# Patient Record
Sex: FEMALE | URBAN - METROPOLITAN AREA
[De-identification: names, ages, dates, MRNs, and addresses within clinical notes are randomized per-mention and may not be internally consistent; named-entity substitution may affect disease eponyms.]

---

## 2021-08-05 ENCOUNTER — APPOINTMENT (RX ONLY)
Dept: URBAN - METROPOLITAN AREA CLINIC 151 | Facility: CLINIC | Age: 66
Setting detail: DERMATOLOGY
End: 2021-08-05

## 2021-08-05 DIAGNOSIS — L30.9 DERMATITIS, UNSPECIFIED: ICD-10-CM

## 2021-08-05 PROCEDURE — ? COUNSELING

## 2021-08-05 PROCEDURE — ? TREATMENT REGIMEN

## 2021-08-05 PROCEDURE — 99202 OFFICE O/P NEW SF 15 MIN: CPT

## 2021-08-05 PROCEDURE — ? PRESCRIPTION

## 2021-08-05 PROCEDURE — ? DIAGNOSIS COMMENT

## 2021-08-05 RX ORDER — CLOBETASOL PROPIONATE 0.5 MG/G
OINTMENT TOPICAL BID
Qty: 1 | Refills: 3 | Status: ERX | COMMUNITY
Start: 2021-08-05

## 2021-08-05 RX ADMIN — CLOBETASOL PROPIONATE: 0.5 OINTMENT TOPICAL at 00:00

## 2021-08-05 NOTE — HPI: OTHER
Condition:: Rash
Please Describe Your Condition:: Patient presents with concerns of a red, previously itchy rash in the groin. Pt states she went to the beach 2 weeks ago and 1 week after returning the rash appeared. Rash was initially itchy but itch resolved after a few days. Pt has applied various OTC moisturizers with no relief.

## 2021-08-05 NOTE — PROCEDURE: DIAGNOSIS COMMENT
Comment: Advised to call clinic in 1 weeks if rash has not resolved or significantly improved.
Render Risk Assessment In Note?: yes
Detail Level: Zone